# Patient Record
Sex: MALE | Race: WHITE
[De-identification: names, ages, dates, MRNs, and addresses within clinical notes are randomized per-mention and may not be internally consistent; named-entity substitution may affect disease eponyms.]

---

## 2021-09-28 ENCOUNTER — HOSPITAL ENCOUNTER (OUTPATIENT)
Dept: HOSPITAL 61 - LAB | Age: 66
End: 2021-09-28
Attending: SURGERY
Payer: COMMERCIAL

## 2021-09-28 DIAGNOSIS — K40.90: ICD-10-CM

## 2021-09-28 DIAGNOSIS — Z20.822: ICD-10-CM

## 2021-09-28 DIAGNOSIS — Z01.812: Primary | ICD-10-CM

## 2021-09-28 PROCEDURE — U0003 INFECTIOUS AGENT DETECTION BY NUCLEIC ACID (DNA OR RNA); SEVERE ACUTE RESPIRATORY SYNDROME CORONAVIRUS 2 (SARS-COV-2) (CORONAVIRUS DISEASE [COVID-19]), AMPLIFIED PROBE TECHNIQUE, MAKING USE OF HIGH THROUGHPUT TECHNOLOGIES AS DESCRIBED BY CMS-2020-01-R: HCPCS

## 2021-09-30 ENCOUNTER — HOSPITAL ENCOUNTER (OUTPATIENT)
Dept: HOSPITAL 61 - SURG | Age: 66
Discharge: HOME | End: 2021-09-30
Attending: SURGERY
Payer: COMMERCIAL

## 2021-09-30 VITALS — DIASTOLIC BLOOD PRESSURE: 77 MMHG | SYSTOLIC BLOOD PRESSURE: 146 MMHG

## 2021-09-30 VITALS — DIASTOLIC BLOOD PRESSURE: 78 MMHG | SYSTOLIC BLOOD PRESSURE: 138 MMHG

## 2021-09-30 VITALS — WEIGHT: 162.04 LBS | BODY MASS INDEX: 21.95 KG/M2 | HEIGHT: 72 IN

## 2021-09-30 DIAGNOSIS — I10: ICD-10-CM

## 2021-09-30 DIAGNOSIS — E78.00: ICD-10-CM

## 2021-09-30 DIAGNOSIS — K21.9: ICD-10-CM

## 2021-09-30 DIAGNOSIS — Z98.890: ICD-10-CM

## 2021-09-30 DIAGNOSIS — Z79.899: ICD-10-CM

## 2021-09-30 DIAGNOSIS — K40.30: Primary | ICD-10-CM

## 2021-09-30 DIAGNOSIS — Z87.891: ICD-10-CM

## 2021-09-30 PROCEDURE — A4364 ADHESIVE, LIQUID OR EQUAL: HCPCS

## 2021-09-30 PROCEDURE — C1781 MESH (IMPLANTABLE): HCPCS

## 2021-09-30 PROCEDURE — A4930 STERILE, GLOVES PER PAIR: HCPCS

## 2021-09-30 PROCEDURE — A4657 SYRINGE W/WO NEEDLE: HCPCS

## 2021-09-30 PROCEDURE — 49650 LAP ING HERNIA REPAIR INIT: CPT

## 2021-09-30 PROCEDURE — A6219 GAUZE <= 16 SQ IN W/BORDER: HCPCS

## 2021-09-30 RX ADMIN — MORPHINE SULFATE PRN MG: 2 INJECTION, SOLUTION INTRAMUSCULAR; INTRAVENOUS at 09:00

## 2021-09-30 RX ADMIN — MORPHINE SULFATE PRN MG: 2 INJECTION, SOLUTION INTRAMUSCULAR; INTRAVENOUS at 09:14

## 2021-09-30 NOTE — PDOC1
History and Physical


Date of Admission


Date of Admission


DATE: 9/30/21 


TIME: 07:44





Identification/Chief Complaint


Chief Complaint


Left groin pain





Source


Source:  Patient





History of Present Illness


History of Present Illness


65-year-old male with complaints of left groin pain consistent with a left 

inguinal hernia





Past Medical History


Cardiovascular:  HTN, Hyperlipidemia


Pulmonary:  No pertinent hx


GI:  GERD


Heme/Onc:  No pertinent hx


Hepatobiliary:  No pertinent hx


Psych:  No pertinent hx


Rheumatologic:  No pertinent hx


Infectious disease:  No pertinent hx


ENT:  No pertinent hx


Renal/:  No pertinent hx


Endocrine:  No pertinent hx


Dermatology:  No pertinent hx





Past Surgical History


Past Surgical History:  No pertinent history





Family History


Family History:  No Significant





Social History


Smoke:  No


ALCOHOL:  none


Drugs:  None





Current Medications


Current Medications





Current Medications


Fentanyl Citrate (Fentanyl 2ml Vial) 25 mcg PRN Q5MIN  PRN IVP MILD PAIN 1-3;  

Start 9/30/21 at 06:00;  Stop 10/1/21 at 05:59


Fentanyl Citrate (Fentanyl 2ml Vial) 50 mcg PRN Q5MIN  PRN IVP MODERATE PAIN 4-

6;  Start 9/30/21 at 06:00;  Stop 10/1/21 at 05:59


Morphine Sulfate (Morphine Sulfate) 1 mg PRN Q10MIN  PRN IVP SEVERE PAIN 7-10;  

Start 9/30/21 at 06:00;  Stop 10/1/21 at 05:59


Ringer's Solution 1,000 ml @  30 mls/hr Q24H IV  Last administered on 9/30/21at 

06:51;  Start 9/30/21 at 06:00;  Stop 9/30/21 at 17:59


Hydromorphone HCl (Dilaudid) 0.5 mg PRN Q10MIN  PRN IVP SEVERE PAIN 7-10, 2nd 

CHOICE;  Start 9/30/21 at 06:00;  Stop 10/1/21 at 05:59


Prochlorperazine Edisylate (Compazine) 5 mg PACU PRN  PRN IVP NAUSEA, MRX1;  

Start 9/30/21 at 06:00;  Stop 10/1/21 at 05:59


Acetaminophen (Tylenol) 1,000 mg 1X PREOP  PRN PO PRIOR TO PROCEDURE Last 

administered on 9/30/21at 06:51;  Start 9/30/21 at 06:00;  Stop 9/30/21 at 18:00


Cefazolin Sodium (Ancef) 1 gm 1X PREOP  PRN IVP PRIOR TO PROCEDURE;  Start 

9/29/21 at 16:45


Mineral Oil (Muri-Lube) 10 ml STK-MED ONCE MC ;  Start 9/30/21 at 07:24;  Stop 

9/30/21 at 07:25;  Status DC


Bupivacaine HCl/ Epinephrine Bitart (Sensorcaine-Epi 0.25%-1:508187 Mpf) 30 ml 

STK-MED ONCE .ROUTE ;  Start 9/30/21 at 07:24;  Stop 9/30/21 at 07:25;  Status 

DC


Fentanyl Citrate (Fentanyl 2ml Vial) 100 mcg STK-MED ONCE .ROUTE ;  Start 

9/30/21 at 07:29;  Stop 9/30/21 at 07:29;  Status DC





Active Scripts


Active


Reported


Pantoprazole Sodium  ** (Pantoprazole Sodium) 40 Mg Tablet.dr 40 Mg PO DAILYAC


Aspirin 81 Mg Tab.chew 1 Tab PO DAILY


Ramipril 10 Mg Capsule 1 Cap PO DAILY 30 Days


Simvastatin 20 Mg Tablet 1 Tab PO QHS


Amlodipine Besylate 5 Mg Tablet 5 Mg PO DAILY





Allergies


Allergies:  


Coded Allergies:  


     No Known Drug Allergies (Unverified , 9/30/21)





ROS


Genitourinary:  YES Pain (Left groin)





Physical Exam


General:  Alert, Oriented X3, Cooperative, No acute distress


HEENT:  Atraumatic, EOMI


Lungs:  Clear to auscultation, Normal air movement


Heart:  RRR, no murmurs


Abdomen:  Normal bowel sounds, Soft, No tenderness


Male Genitals Exam:  other (Left inguinal hernia)


Rectal Exam:  not examined


Extremities:  No edema


Skin:  No significant lesion


Neuro:  Normal speech


Psych/Mental Status:  Mental status NL





Vitals


Vitals





Vital Signs








  Date Time  Temp Pulse Resp B/P (MAP) Pulse Ox O2 Delivery O2 Flow Rate FiO2


 


9/30/21 06:48 97.9 72 20  99   





 97.9       


 


9/30/21 06:36    146/77  Room Air  











VTE Prophylaxis Ordered


VTE Prophylaxis Devices:  Yes


VTE Pharmacological Prophylaxi:  Contraindicated





Assessment/Plan


Assessment/Plan


Left inguinal hernia plan robotic assisted laparoscopic left inguinal hernia 

repair





Justifications for Admission


Other Justification














HERMILO VILLEGAS MD             Sep 30, 2021 07:46

## 2021-09-30 NOTE — DISCH
DISCHARGE INSTRUCTIONS


Condition on Discharge


Condition on Discharge:  Stable





Activity After Discharge


Activity Instructions for Disc:  Avoid exertion


Other activity instructions:  No lifting more than 20 pounds for 2 weeks





Diet after Discharge


Diet after Discharge:  Regular





Wound Incision Care


Other wound/incision instructi:  May shower in 24 hours





Contacting the  after DC


Call your doctor for:  If your condition worsens





Follow-Up


Follow up with:  Dr. Villegas in 2 weeks











HERMILO VILLEGAS MD             Sep 30, 2021 08:48

## 2021-09-30 NOTE — PDOC4
Operative Note


Operative Note


Date: September 30, 2021 at 842


Preoperative diagnosis: Left inguinal hernia


Postoperative diagnosis: Same


Procedure: Robotic assisted laparoscopic left inguinal hernia repair with mesh


Surgeon: Fabrizio


Specimen: None


Dictation: Patient is a 65-year-old gentleman with a left inguinal hernia.  

Procedure of robotic assisted laparoscopic left inguinal hernia repair with mesh

was explained to the patient detail risk benefits were also discussed including 

bleeding infection injury to intra-abdominal contents possible necessitating 

further open operations alternatives to this procedure also discussed with the 

patient who seemed to understand and gave a verbal written consent to have 

procedure performed.  Patient was taken to the operating room placed in the 

supine position general anesthesia was initiated once patient was sleeping 

intubated placed in low lithotomy positioning and his abdomen was prepped and 

draped usual sterile fashion using ChloraPrep.  Area just above the umbilicus 

was injected with quarter percent Marcaine with epinephrine incision was made 11

blade scalpel and a varies needle was placed within the abdomen creating 

pneumoperitoneum once this was complete 8 mm ventral port was placed and da 

Harshad camera's placed within the abdomen which was inspected was noted that 

there was a left inguinal hernia with some incarcerated omentum.  8 mm da Harshad 

port was placed in the right midabdomen and an 8 mm ventral port was placed in 

the left midabdomen the da Harshad robot is brought and docked all port sites 

surgeon went to the robotic console using a grasper and Endo Celio scissors the

incarcerated omentum was reduced a incision was made in the peritoneum on the 

left side this carried down using electrocautery to create a inferior flap which

reduce the hernia sac and contents.  A large Bard 3D max mesh for the left side 

was then placed over the defect and the peritoneum was then closed over the mesh

with a running 2 OV lock absorbable suture.  Suture was removed from the abdomen

the da Harshad robot was undocked from all port sites the pneumoperitoneum was 

reduced all ports were removed.  All port sites were closed with 4 subcuticular 

Monocryl Mastisol Steri-Strips and island dressings were applied.  Patient was 

awakened and extubated in the operating room taken to recovery in stable 

condition all sponge instrument needle counts listed as correct estimated blood 

loss 5 mL











HERMILO VILLEGAS MD             Sep 30, 2021 08:45